# Patient Record
Sex: FEMALE | Race: WHITE | URBAN - METROPOLITAN AREA
[De-identification: names, ages, dates, MRNs, and addresses within clinical notes are randomized per-mention and may not be internally consistent; named-entity substitution may affect disease eponyms.]

---

## 2019-11-22 ENCOUNTER — HISTORICAL (OUTPATIENT)
Dept: RADIOLOGY | Facility: HOSPITAL | Age: 44
End: 2019-11-22

## 2019-12-13 ENCOUNTER — TELEPHONE (OUTPATIENT)
Dept: HEMATOLOGY/ONCOLOGY | Facility: CLINIC | Age: 44
End: 2019-12-13

## 2019-12-13 NOTE — TELEPHONE ENCOUNTER
----- Message from Claudia Blunt sent at 12/12/2019  9:53 AM CST -----  Regarding: Appt request   Hi [facility] CANCER NAVIGATION,     ELYSIA Carrasco is referring this pt to onc for dx lympadenpathy    Can you plz ctc the pt to schedule an appt.     The refrl and records are in media mgr.     I'll check epic for appt and chart for updates.     Thank you,  Claudia Blunt   [facility]    [id number]

## 2019-12-13 NOTE — NURSING
LM for pt to call me back regarding referral received from Bessie Carrasco NP in [place], [place].  Direct call back number given.  Pt scheduled for first available appt on 1/2/20.  Spoke with Dr. Alas regarding consult and she would like me to contact the referring provider and see if biopsy could be done while waiting on appt.  Will call referring provider once I speak with patient

## 2019-12-16 NOTE — NURSING
Call[facility] and [facility] with [phone] regarding referral received from Bessie Carrasco.  Notified Karon that pt cancelled appt due to not having transportation here for appt.  Karon stated that the pt called and talked to her on Saturday and she told the pt to keep the appt.  Karon also stated that she told the pt that her insurance would pay for transportation here.  Asked Karon if she has tried any hem/onc providers in [place] due to the transportation.  Karon stated that no one is going to take her in [place]. Asked Karon why and she stated that it is because she does not have a diagnosis.  Notified Karon that we also would like biopsy with pathology report done prior to appt.  Karon stated that she would call and talk to the pt.  Karon given my direct contact number to call with any questions or concerns.

## 2019-12-16 NOTE — NURSING
"Called and spoke with pt regarding referral received from RAUL Carrasco NP for lymphadenopathy.  Pt stated that she called and spoke with that doctors office on Saturday regarding my message.  Pt stated that she does not have transportation to come here for an appointment.  Pt states that she has the hernia surgery scheduled for 1/16 and is having a doctors appt on 1/9.  Pt stated that she would like to see someone closer to home due to transportation.  Notified pt that I would call her providers office and notify them of her request.  Asked pt if she wanted me to keep the appt previously scheduled with  for 1/2.  Pt stated, "No, I want someone else to have that appt because I cant get there".  Cancelled appt per pt request and notified pt.  "

## 2020-01-06 ENCOUNTER — HISTORICAL (OUTPATIENT)
Dept: RADIOLOGY | Facility: HOSPITAL | Age: 45
End: 2020-01-06

## 2020-01-09 ENCOUNTER — HISTORICAL (OUTPATIENT)
Dept: LAB | Facility: HOSPITAL | Age: 45
End: 2020-01-09

## 2020-01-09 LAB
COLOR STL: NORMAL
CONSISTENCY STL: NORMAL
HEMOCCULT SP1 STL QL: NEGATIVE
HEMOCCULT SP2 STL QL: NEGATIVE

## 2021-02-01 ENCOUNTER — TELEPHONE (OUTPATIENT)
Dept: OBSTETRICS AND GYNECOLOGY | Facility: CLINIC | Age: 46
End: 2021-02-01

## 2022-04-10 ENCOUNTER — HISTORICAL (OUTPATIENT)
Dept: ADMINISTRATIVE | Facility: HOSPITAL | Age: 47
End: 2022-04-10
Payer: MEDICAID

## 2022-04-24 VITALS
SYSTOLIC BLOOD PRESSURE: 154 MMHG | HEIGHT: 65 IN | DIASTOLIC BLOOD PRESSURE: 100 MMHG | BODY MASS INDEX: 32.51 KG/M2 | WEIGHT: 195.13 LBS